# Patient Record
Sex: FEMALE | Race: WHITE | Employment: STUDENT | URBAN - METROPOLITAN AREA
[De-identification: names, ages, dates, MRNs, and addresses within clinical notes are randomized per-mention and may not be internally consistent; named-entity substitution may affect disease eponyms.]

---

## 2019-11-14 ENCOUNTER — HOSPITAL ENCOUNTER (EMERGENCY)
Facility: CLINIC | Age: 24
Discharge: HOME | End: 2019-11-14
Attending: EMERGENCY MEDICINE | Admitting: EMERGENCY MEDICINE
Payer: COMMERCIAL

## 2019-11-14 VITALS
BODY MASS INDEX: 18.88 KG/M2 | WEIGHT: 100 LBS | HEART RATE: 107 BPM | SYSTOLIC BLOOD PRESSURE: 137 MMHG | DIASTOLIC BLOOD PRESSURE: 97 MMHG | RESPIRATION RATE: 18 BRPM | HEIGHT: 61 IN | OXYGEN SATURATION: 100 % | TEMPERATURE: 98.8 F

## 2019-11-14 DIAGNOSIS — S61.011A LACERATION OF RIGHT THUMB WITHOUT FOREIGN BODY, NAIL DAMAGE STATUS UNSPECIFIED, INITIAL ENCOUNTER: ICD-10-CM

## 2019-11-14 DIAGNOSIS — L02.211 ABSCESS OF FLANK: ICD-10-CM

## 2019-11-14 PROCEDURE — 87077 CULTURE AEROBIC IDENTIFY: CPT | Performed by: EMERGENCY MEDICINE

## 2019-11-14 PROCEDURE — 87186 SC STD MICRODIL/AGAR DIL: CPT | Performed by: EMERGENCY MEDICINE

## 2019-11-14 PROCEDURE — 87070 CULTURE OTHR SPECIMN AEROBIC: CPT | Performed by: EMERGENCY MEDICINE

## 2019-11-14 PROCEDURE — 99283 EMERGENCY DEPT VISIT LOW MDM: CPT

## 2019-11-14 RX ORDER — DOXYCYCLINE 100 MG/1
100 CAPSULE ORAL 2 TIMES DAILY
Qty: 14 CAPSULE | Refills: 0 | Status: SHIPPED | OUTPATIENT
Start: 2019-11-14 | End: 2019-11-19

## 2019-11-14 ASSESSMENT — ENCOUNTER SYMPTOMS: WOUND: 1

## 2019-11-14 ASSESSMENT — MIFFLIN-ST. JEOR: SCORE: 1150.98

## 2019-11-14 NOTE — ED TRIAGE NOTES
Pt has injury to right thumb while trying to close a pocket knife.  She also reports a rash on left trunk which she is requesting antibiotic.

## 2019-11-14 NOTE — ED PROVIDER NOTES
"  History     Chief Complaint:    Laceration and Rash      HPI    Ria Yang is a 24 year old female who presents to the ED for evaluation of a laceration and a rash. The patient states that she accidentally cute her distal right thumb a couple of hours prior to arrival with a pocket knife. She was unable to completely stop the bleeding which prompted her visit to the ED. She still has normal flexion and extension of her finger.    She also complains of multiple cysts on her left flank. She reports that she has been seen for this by her primary care provider but never received the antibiotics that she was supposed to. She note some of the cysts/abscesses have drained.     Allergies:  Robitussin Child     Medications:    The patient is currently on no regular medications.    Past Medical History:    Asthma  No hx of MRSA    Past Surgical History:    Gyn surgery    Family History:    No past pertinent family history.    Social History:  Negative for tobacco use.  Negative for alcohol use.  Presents with boyfriend.  Last Tdap was updated 8/31/2009.     Review of Systems   Skin: Positive for rash and wound.   All other systems reviewed and are negative.        Physical Exam   First Vitals:  Heart Rate: 120  Temp: 98.8  F (37.1  C)  Resp: 20  Height: 154.9 cm (5' 1\")  Weight: 45.4 kg (100 lb)  SpO2: 100 %      Physical Exam  General: Patient is alert and interactive when I enter the room  Head:  The scalp, face, and head appear normal  Eyes:  The pupils are equal, round, and reactive to light    Conjunctivae and sclerae are normal  ENT:    External acoustic canals are normal    The oropharynx is normal without erythema.     Uvula is in the midline  Neck:  Normal range of motion  CV:  Regular rate. S1/S2. No murmurs.   Resp:  Lungs are clear without wheezes or rales. No distress  GI:  Abdomen is soft, no rigidity, guarding, or rebound    No distension. No tenderness to palpation in any quadrant.     MS:  Normal tone. " Joints grossly normal without effusions.     No asymmetric leg swelling, calf or thigh tenderness.      Normal motor assessment of all extremities.  Skin:  Scattered  Papules to left flank that appear to be healing small abscesses in various stages of healing. One larger scabbed over abscess is noted and can express pus after scab removed and this is cultured. No surrounding cellulitis.  Superficial laceration to right thumb. Normal capillary refill noted  Neuro:  Speech is normal and fluent. Face is symmetric.     Moving all extremities well.   Psych:  Awake. Alert.  Normal affect.  Appropriate interactions.  Lymph: No anterior cervical lymphadenopathy noted    Emergency Department Course   Laboratory:  Abscess culture: pending    Procedures:    Incision and Drainage     LOCATIONS:  Left flank     ANESTHESIA:  none     PREPARATION:  none     PROCEDURE:  Scab was scrapped off without anesthesia with # 11 Blade (side of blade) and immediate pus oozed out of wound and was cultured. Wound treatment included Purulent Drainage and expression.  Packing consisted of No Packing.  Appropriate dressing was applied to cover the area.    PATIENT STATUS:        Patient tolerated the procedure well. There were no complications.    Emergency Department Course:  Nursing notes and vitals reviewed. (0740) I performed an exam of the patient as documented above.     0800 I performed and incision and drainage as noted above.     Findings and plan explained to the Patient. Patient discharged home with instructions regarding supportive care, medications, and reasons to return. The importance of close follow-up was reviewed. The patient was prescribed Doxycycline.       Impression & Plan    Medical Decision Making:  Ria Yang is a 24 year old female who presents for evaluation of a laceration to the distal right thumb.  The wound was carefully evaluated and explored.  The laceration was treated with bacitracin and closed with a  bandaid.  There is no evidence of muscular, tendon, or bony damage with this laceration.  No signs of foreign body.  Possible complications (infection, scarring) were reviewed with the patient.  Follow up with primary care as noted in the discharge section.    She also has signs of an abscess. I&D performed and successfully expressed purulent drainage; see above procedure note. No signs of serious infx like necrotizing fascitis or rapid cellulitis given fever curve, spread of erythema over past 24 hours, no crepitance to tissues, no sensation change to tissues.  Will need wound cares q day.  Plan home w/ f/u of surgery or primary; may return to ED for wound check if cannot arrange.  Abx given as she has some erythema and concerns about cellulitis. Warning signs for wound given on discharge instructions and verbally; see d/c instructions.    Diagnosis:    ICD-10-CM    1. Abscess of flank L02.211    2. Laceration of right thumb without foreign body, nail damage status unspecified, initial encounter S61.011A        Disposition:  discharged to home    Discharge Medications:  New Prescriptions    DOXYCYCLINE HYCLATE (VIBRAMYCIN) 100 MG CAPSULE    Take 1 capsule (100 mg) by mouth 2 times daily for 5 days     Scribe Disclosure:  Zach REGALADO, am serving as a scribe on 11/14/2019 at 7:40 AM to personally document services performed by Seth Nicole MD based on my observations and the provider's statements to me.        Zcah Saavedra  11/14/2019   [facility] EMERGENCY DEPARTMENT       Seth Nicole MD  11/14/19 0855

## 2019-11-14 NOTE — ED AVS SNAPSHOT
[facility]  [address]  [facility] [address]  Phone:  [phone]  Fax:  [phone]                                    Ria Yang   MRN: [id number]    Department:  [facility]   Date of Visit:  11/14/2019           After Visit Summary Signature Page    I have received my discharge instructions, and my questions have been answered. I have discussed any challenges I see with this plan with the nurse or doctor.    ..........................................................................................................................................  Patient/Patient Representative Signature      ..........................................................................................................................................  Patient Representative Print Name and Relationship to Patient    ..................................................               ................................................  Date                                   Time    ..........................................................................................................................................  Reviewed by Signature/Title    ...................................................              ..............................................  Date                                               Time          22EPIC Rev 08/18

## 2019-11-15 NOTE — RESULT ENCOUNTER NOTE
[place] ED discharge antibiotic (if prescribed):  Doxycycline 100 mg PO tablet, 1 tablet (100 mg) by mouth 2 times daily for 5 days  Incision and Drainage performed in [place] ED [Yes / No] : Yes  No changes in treatment per ED lab result protocol.

## 2019-11-16 LAB
BACTERIA SPEC CULT: ABNORMAL
SPECIMEN SOURCE: ABNORMAL

## 2019-11-17 ENCOUNTER — TELEPHONE (OUTPATIENT)
Dept: EMERGENCY MEDICINE | Facility: CLINIC | Age: 24
End: 2019-11-17

## 2019-11-17 NOTE — TELEPHONE ENCOUNTER
[facility] Emergency Department Lab result notification:    [place] ED lab result protocol used  General culture    Reason for call  Notify of lab results, assess symptoms,  review ED providers recommendations/discharge instructions (if necessary) and advise per ED lab result f/u protocol    Lab Result   Final Wound culture (left chest) report on 11/16/19  Emergency Dept discharge antibiotic prescribed: Doxycycline 100 mg PO tablet, 1 tablet (100 mg) by mouth 2 times daily for 5 days  #1. Bacteria, Moderate growth Methicillin resistant Staphylococcus aureus (MRSA) , which is [SUSCEPTIBLE] to antibiotic   Incision and Drainage performed in [place] ED [Yes / No]: Yes  Patient to be notified of result, symptoms assessed and advised per [place] ED lab result protocol.  Information table from ED Provider visit on 11/14/19  Symptoms reported at ED visit (Chief complaint, HPI) Laceration and Rash        HPI    Ria Yang is a 24 year old female who presents to the ED for evaluation of a laceration and a rash. The patient states that she accidentally cute her distal right thumb a couple of hours prior to arrival with a pocket knife. She was unable to completely stop the bleeding which prompted her visit to the ED. She still has normal flexion and extension of her finger.    She also complains of multiple cysts on her left flank. She reports that she has been seen for this by her primary care provider but never received the antibiotics that she was supposed to. She note some of the cysts/abscesses have drained.    ED providers Impression and Plan (applicable information) Ria Yang is a 24 year old female who presents for evaluation of a laceration to the distal right thumb.  The wound was carefully evaluated and explored.  The laceration was treated with bacitracin and closed with a bandaid.  There is no evidence of muscular, tendon, or bony damage with this laceration.  No signs of foreign body.   "Possible complications (infection, scarring) were reviewed with the patient.  Follow up with primary care as noted in the discharge section.     She also has signs of an abscess. I&D performed and successfully expressed purulent drainage; see above procedure note. No signs of serious infx like necrotizing fascitis or rapid cellulitis given fever curve, spread of erythema over past 24 hours, no crepitance to tissues, no sensation change to tissues.  Will need wound cares q day.  Plan home w/ f/u of surgery or primary; may return to ED for wound check if cannot arrange.  Abx given as she has some erythema and concerns about cellulitis. Warning signs for wound given on discharge instructions and verbally; see d/c instructions.   Miscellaneous information na     RN Assessment (Patient s current Symptoms), include time called.  [Insert Left message here if message left]  5:22PM: Spoke with Patient. She states she is doing well. \"I can hardly see it (side wound)\" And states that her thumb laceration is healing. Is taking the antibiotic as directed.  RN Recommendations/Instructions per [place] ED lab result protocol  Patient notified of lab result and treatment recommendation.   Reviewed information regarding MRSA from protocol/patient education and Epic reference.  Advised to follow up with PCP and dermatology as directed by the ED provider.  Patient states understanding of the information given and has no further questions.     Please Contact your PCP clinic or return to the Emergency department if your:    Symptoms worsen or other concerning symptom's.    PCP follow-up Questions asked: YES       [RN Name]  Joaquina Nicole RN  Where Center RN  Lung Nodule and ED Lab Result RN  Epic pool (ED late result f/u RN): [id number]  [facility] INCIDENTAL RADIOLOGY F/U NURSES: P [id number]  Ph# [phone]      Copy of Lab result   Wound Culture Aerobic Bacterial [[id number]] (Order [id number])   Order Requisition     Wound Culture Aerobic " Bacterial (Order #[id number]) on 11/14/19   Exam Information     Exam Date Exam Time Accession # Results    11/14/19  8:17 AM [id number]    Component Results     Specimen Information: Left        Component Collected Lab   Specimen Description 11/14/2019  8:17    Left Chest    Culture Micro Abnormal  11/14/2019  8:17    Moderate growth   Methicillin resistant Staphylococcus aureus (MRSA)    Susceptibility     Methicillin resistant staphylococcus aureus (mrsa)     Antibiotic Interpretation Sensitivity Method Status   CLINDAMYCIN Sensitive <=0.25 ug/mL DHAVAL Final    This isolate DOES NOT demonstrate inducible clindamycin resistance in vitro.   Clindamycin is susceptible and could be used when indicated, however,   erythromycin is resistant and should not be used.   ERYTHROMYCIN Resistant >=8 ug/mL DHAVAL Final   GENTAMICIN Sensitive <=0.5 ug/mL DHAVAL Final   LINEZOLID Sensitive 2 ug/mL DHAVAL Final   OXACILLIN Resistant >=4 ug/mL DHAVAL Final   TETRACYCLINE Sensitive <=1 ug/mL DHAVAL Final   Trimethoprim/Sulfa Sensitive <=0.5/9.5 ug/mL DHAVAL Final   VANCOMYCIN Sensitive 1 ug/mL DHAVAL Final